# Patient Record
Sex: FEMALE | Race: WHITE | NOT HISPANIC OR LATINO | Employment: FULL TIME | URBAN - METROPOLITAN AREA
[De-identification: names, ages, dates, MRNs, and addresses within clinical notes are randomized per-mention and may not be internally consistent; named-entity substitution may affect disease eponyms.]

---

## 2023-12-05 ENCOUNTER — OFFICE VISIT (OUTPATIENT)
Dept: FAMILY MEDICINE CLINIC | Facility: CLINIC | Age: 48
End: 2023-12-05
Payer: COMMERCIAL

## 2023-12-05 ENCOUNTER — LAB (OUTPATIENT)
Dept: FAMILY MEDICINE CLINIC | Facility: CLINIC | Age: 48
End: 2023-12-05
Payer: COMMERCIAL

## 2023-12-05 VITALS
HEART RATE: 79 BPM | WEIGHT: 157.2 LBS | RESPIRATION RATE: 16 BRPM | DIASTOLIC BLOOD PRESSURE: 82 MMHG | OXYGEN SATURATION: 98 % | SYSTOLIC BLOOD PRESSURE: 116 MMHG | HEIGHT: 59 IN | BODY MASS INDEX: 31.69 KG/M2

## 2023-12-05 DIAGNOSIS — Z12.31 SCREENING MAMMOGRAM FOR BREAST CANCER: ICD-10-CM

## 2023-12-05 DIAGNOSIS — Z12.11 SCREENING FOR COLORECTAL CANCER: ICD-10-CM

## 2023-12-05 DIAGNOSIS — Z00.00 ENCOUNTER FOR ANNUAL PHYSICAL EXAM: ICD-10-CM

## 2023-12-05 DIAGNOSIS — Z12.12 SCREENING FOR COLORECTAL CANCER: ICD-10-CM

## 2023-12-05 DIAGNOSIS — Z00.00 ENCOUNTER FOR ANNUAL PHYSICAL EXAM: Primary | ICD-10-CM

## 2023-12-05 PROCEDURE — 83036 HEMOGLOBIN GLYCOSYLATED A1C: CPT | Performed by: STUDENT IN AN ORGANIZED HEALTH CARE EDUCATION/TRAINING PROGRAM

## 2023-12-05 PROCEDURE — 85025 COMPLETE CBC W/AUTO DIFF WBC: CPT | Performed by: STUDENT IN AN ORGANIZED HEALTH CARE EDUCATION/TRAINING PROGRAM

## 2023-12-05 PROCEDURE — 80061 LIPID PANEL: CPT | Performed by: STUDENT IN AN ORGANIZED HEALTH CARE EDUCATION/TRAINING PROGRAM

## 2023-12-05 PROCEDURE — 86803 HEPATITIS C AB TEST: CPT | Performed by: STUDENT IN AN ORGANIZED HEALTH CARE EDUCATION/TRAINING PROGRAM

## 2023-12-05 PROCEDURE — 80053 COMPREHEN METABOLIC PANEL: CPT | Performed by: STUDENT IN AN ORGANIZED HEALTH CARE EDUCATION/TRAINING PROGRAM

## 2023-12-05 PROCEDURE — 36415 COLL VENOUS BLD VENIPUNCTURE: CPT

## 2023-12-05 NOTE — PROGRESS NOTES
[facility]   New Patient Visit    Patient Name:Jessika Vegas  : 1975  MRN: [id number]  Primary Care Physician: Mora Clark MD    Subjective    SUBJECTIVE     Chief Complaint  Chief Complaint   Patient presents with    [facility] Care     No   Est care       History of Present Illness  Jessika Vegas is a 48 y.o. female who presents to clinic to establish [facility].       Chronic Conditions/Other Concerns   Bump on L head: started over the summer; thought it was pimple but staying and not resolving; not growing or changing in size      Healthcare Maintenance   Diet: healthy eating habits and balanced diet  Exercise: generally stays active (but no regular exercise)  Mood: Denies depression symptoms (PHQ-2 screen is negative)  Vision/Dental Health: UTD, no concerns   reports that she has never smoked. She has never used smokeless tobacco. She reports current alcohol use. She reports that she does not use drugs.  Mammogram 2 years ago; normal  Pap smear: last 5 years ago; all normal (22-23 years ago; hx of colposcopy but normal)    Reproductive Health   Menstrual Status: premenopausal  regular, normal duration, and normal flow  Currently sexually active, , and in mutually monogamous relationship   reports being sexually active and has had partner(s) who are male. She reports using the following method of birth control/protection: Vasectomy.  Contraception: vasectomy    Health Maintenance   Topic Date Due    COLORECTAL CANCER SCREENING  Never done    TDAP/TD VACCINES (1 - Tdap) Never done    INFLUENZA VACCINE  2023    ANNUAL PHYSICAL  Never done    PAP SMEAR  Never done    BMI FOLLOWUP  2024    HEPATITIS C SCREENING  Completed    Pneumococcal Vaccine 0-64  Aged Out    COVID-19 Vaccine  Discontinued       Review of Systems  A medically appropriate and patient-specific review of systems was performed. Pertinent findings are mentioned in the HPI,  "with no additional significant findings beyond those already noted.    Patient History   Personally reviewed patient's prior histories and updated the chart; pertinent information mentioned below:  Review (Popup)     History Review Reviewed Comments   Past Medical History:  [x]   has no past medical history on file.   Past Surgical History: [x]   has a past surgical history that includes Cholecystectomy (12/2015).   Family History: [x]  family history includes Alcohol abuse in her father; COPD in her father; Hyperlipidemia in her mother; Miscarriages / Stillbirths in her mother; Stroke in her father.   Social History: [x]   reports that she has never smoked. She has never used smokeless tobacco. She reports current alcohol use. She reports that she does not use drugs.   Medications [x]    Allergies [x] Patient has no known allergies.          Objective   OBJECTIVE     Vitals:    12/05/23 1529   BP: 116/82   BP Location: Left arm   Patient Position: Sitting   Cuff Size: Large Adult   Pulse: 79   Resp: 16   SpO2: 98%   Weight: 71.3 kg (157 lb 3.2 oz)   Height: 149.9 cm (59\")      BMI Readings from Last 3 Encounters:   12/05/23 31.75 kg/m²      BMI is >= 30 and <35. (Class 1 Obesity). The following options were offered after discussion;: exercise counseling/recommendations and nutrition counseling/recommendations    Physical Exam   Constitutional: Alert, well-appearing, no acute distress  Eyes: Vision grossly intact, sclerae anicteric, no conjunctival injection  HENT: NCAT, mucous membranes moist, normal dentition  Neck: Supple, no thyromegaly, no lymphadenopathy, trachea midline  Respiratory: No respiratory distress, good effort and air entry, clear to auscultation bilaterally   Cardiovascular: RRR, no murmurs, normal peripheral perfusion, no LE edema  Gastrointestinal: Soft, nondistended, nontender, bowel sounds present  Musculoskeletal: Strength grossly intact, appropriate ROM in all extremities, no obvious " deformities or injuries  Psychiatric: Appropriate mood and affect, cooperative  Neurologic: At baseline, motor and sensory function grossly intact, moves all extremities equally  Skin: No apparent rashes or lesions         Assessment    ASSESSMENT & PLAN       ANNUAL PHYSICAL EXAM; HEALTHCARE MAINTENANCE  Preventative  Immunizations  - Due for Tdap and influenza.   Cancer Screenings  Colon: currently due, appropriate for cologuard, ordered today  Breast: Currently due, order placed today  Cervical: Currently due & offered today, will reschedule due to patient preference/request  CV Risk  A1c, Lipids: ordered today  Infectious Disease  Hep C: Needs one-time screening, ordered today  STDs: Screening not currently indicated.   PLAN:  Influenza administered today. Will get Tdap at next visit or at local pharmacy.  Orders placed today: cologuard, mammogram, Hep C Ab, Hgb A1c, Lipid Panel, routine labs including CBCd, CMP  Obesity: Body mass index is 31.75 kg/m². Reviewed associated health risks. Recommended therapeutic lifestyle interventions; counseling and education provided.  Routine preventative/screening labs ordered today (as above) and are currently pending; further management pending results  Counseled patient on the following topics: healthy diet and nutrition, physical activity, healthy weight, immunizations including risks and benefits, disease prevention and preventative screenings, injury prevention (seat belt use, helmet use on motorcycles/bicycles, home safety measures), high risk behaviors and specific risk reduction strategies, dental health, routine vision screening, mental health and appropriate coping mechanisms and management of stress/anxiety.      Orders Placed This Encounter   Procedures    Mammo Screening Digital Tomosynthesis Bilateral With CAD    Fluzone >6 Months ([id number])    CBC Auto Differential    Comprehensive Metabolic Panel    Hemoglobin A1c    Lipid Panel    Hepatitis C Antibody     Cologuard - Stool, Per Rectum          Follow Up   Return in about 1 year (around 12/5/2024) for Annual physical.      This medical documentation was produced in part using speech recognition software (Dragon Dictation System) and may contain errors related to that system including errors in grammar, punctuation, and spelling, as well as words and phrases that may be inappropriate and not intentional --- If there are any questions or concerns please feel free to contact me, the dictating provider, for clarification.      Mora Clark MD

## 2023-12-06 LAB
ALBUMIN SERPL-MCNC: 4.7 G/DL (ref 3.5–5.2)
ALBUMIN/GLOB SERPL: 1.6 G/DL
ALP SERPL-CCNC: 55 U/L (ref 39–117)
ALT SERPL W P-5'-P-CCNC: 13 U/L (ref 1–33)
ANION GAP SERPL CALCULATED.3IONS-SCNC: 13 MMOL/L (ref 5–15)
AST SERPL-CCNC: 15 U/L (ref 1–32)
BASOPHILS # BLD AUTO: 0.05 10*3/MM3 (ref 0–0.2)
BASOPHILS NFR BLD AUTO: 0.4 % (ref 0–1.5)
BILIRUB SERPL-MCNC: 0.3 MG/DL (ref 0–1.2)
BUN SERPL-MCNC: 16 MG/DL (ref 6–20)
BUN/CREAT SERPL: 24.6 (ref 7–25)
CALCIUM SPEC-SCNC: 9.5 MG/DL (ref 8.6–10.5)
CHLORIDE SERPL-SCNC: 103 MMOL/L (ref 98–107)
CHOLEST SERPL-MCNC: 190 MG/DL (ref 0–200)
CO2 SERPL-SCNC: 20 MMOL/L (ref 22–29)
CREAT SERPL-MCNC: 0.65 MG/DL (ref 0.57–1)
DEPRECATED RDW RBC AUTO: 44.2 FL (ref 37–54)
EGFRCR SERPLBLD CKD-EPI 2021: 108.8 ML/MIN/1.73
EOSINOPHIL # BLD AUTO: 0.4 10*3/MM3 (ref 0–0.4)
EOSINOPHIL NFR BLD AUTO: 3.4 % (ref 0.3–6.2)
ERYTHROCYTE [DISTWIDTH] IN BLOOD BY AUTOMATED COUNT: 13.3 % (ref 12.3–15.4)
GLOBULIN UR ELPH-MCNC: 3 GM/DL
GLUCOSE SERPL-MCNC: 84 MG/DL (ref 65–99)
HBA1C MFR BLD: 5.5 % (ref 4.8–5.6)
HCT VFR BLD AUTO: 42.8 % (ref 34–46.6)
HCV AB SER DONR QL: NORMAL
HDLC SERPL-MCNC: 62 MG/DL (ref 40–60)
HGB BLD-MCNC: 14.3 G/DL (ref 12–15.9)
IMM GRANULOCYTES # BLD AUTO: 0.05 10*3/MM3 (ref 0–0.05)
IMM GRANULOCYTES NFR BLD AUTO: 0.4 % (ref 0–0.5)
LDLC SERPL CALC-MCNC: 108 MG/DL (ref 0–100)
LDLC/HDLC SERPL: 1.71 {RATIO}
LYMPHOCYTES # BLD AUTO: 3.62 10*3/MM3 (ref 0.7–3.1)
LYMPHOCYTES NFR BLD AUTO: 31 % (ref 19.6–45.3)
MCH RBC QN AUTO: 30.6 PG (ref 26.6–33)
MCHC RBC AUTO-ENTMCNC: 33.4 G/DL (ref 31.5–35.7)
MCV RBC AUTO: 91.5 FL (ref 79–97)
MONOCYTES # BLD AUTO: 0.65 10*3/MM3 (ref 0.1–0.9)
MONOCYTES NFR BLD AUTO: 5.6 % (ref 5–12)
NEUTROPHILS NFR BLD AUTO: 59.2 % (ref 42.7–76)
NEUTROPHILS NFR BLD AUTO: 6.9 10*3/MM3 (ref 1.7–7)
NRBC BLD AUTO-RTO: 0 /100 WBC (ref 0–0.2)
PLATELET # BLD AUTO: 290 10*3/MM3 (ref 140–450)
PMV BLD AUTO: 11.5 FL (ref 6–12)
POTASSIUM SERPL-SCNC: 3.9 MMOL/L (ref 3.5–5.2)
PROT SERPL-MCNC: 7.7 G/DL (ref 6–8.5)
RBC # BLD AUTO: 4.68 10*6/MM3 (ref 3.77–5.28)
SODIUM SERPL-SCNC: 136 MMOL/L (ref 136–145)
TRIGL SERPL-MCNC: 110 MG/DL (ref 0–150)
VLDLC SERPL-MCNC: 20 MG/DL (ref 5–40)
WBC NRBC COR # BLD AUTO: 11.67 10*3/MM3 (ref 3.4–10.8)

## 2024-01-16 ENCOUNTER — TELEPHONE (OUTPATIENT)
Dept: FAMILY MEDICINE CLINIC | Facility: CLINIC | Age: 49
End: 2024-01-16
Payer: COMMERCIAL

## 2024-12-06 ENCOUNTER — OFFICE VISIT (OUTPATIENT)
Dept: FAMILY MEDICINE CLINIC | Facility: CLINIC | Age: 49
End: 2024-12-06
Payer: COMMERCIAL

## 2024-12-06 ENCOUNTER — LAB (OUTPATIENT)
Dept: FAMILY MEDICINE CLINIC | Facility: CLINIC | Age: 49
End: 2024-12-06
Payer: COMMERCIAL

## 2024-12-06 VITALS
RESPIRATION RATE: 18 BRPM | OXYGEN SATURATION: 98 % | WEIGHT: 174.3 LBS | SYSTOLIC BLOOD PRESSURE: 126 MMHG | HEART RATE: 93 BPM | HEIGHT: 59 IN | DIASTOLIC BLOOD PRESSURE: 72 MMHG | BODY MASS INDEX: 35.14 KG/M2

## 2024-12-06 DIAGNOSIS — Z00.00 ENCOUNTER FOR ANNUAL PHYSICAL EXAM: Primary | ICD-10-CM

## 2024-12-06 DIAGNOSIS — Z00.00 ENCOUNTER FOR ANNUAL PHYSICAL EXAM: ICD-10-CM

## 2024-12-06 DIAGNOSIS — Z01.419 ENCOUNTER FOR GYNECOLOGICAL EXAMINATION: ICD-10-CM

## 2024-12-06 DIAGNOSIS — Z23 NEED FOR TETANUS, DIPHTHERIA, AND ACELLULAR PERTUSSIS (TDAP) VACCINE: ICD-10-CM

## 2024-12-06 PROBLEM — K21.9 GASTROESOPHAGEAL REFLUX DISEASE: Status: RESOLVED | Noted: 2024-12-06 | Resolved: 2024-12-06

## 2024-12-06 LAB
ALBUMIN SERPL-MCNC: 4.2 G/DL (ref 3.5–5.2)
ALBUMIN/GLOB SERPL: 1.3 G/DL
ALP SERPL-CCNC: 51 U/L (ref 39–117)
ALT SERPL W P-5'-P-CCNC: 25 U/L (ref 1–33)
ANION GAP SERPL CALCULATED.3IONS-SCNC: 11 MMOL/L (ref 5–15)
AST SERPL-CCNC: 20 U/L (ref 1–32)
BASOPHILS # BLD AUTO: 0.05 10*3/MM3 (ref 0–0.2)
BASOPHILS NFR BLD AUTO: 0.5 % (ref 0–1.5)
BILIRUB SERPL-MCNC: 0.2 MG/DL (ref 0–1.2)
BUN SERPL-MCNC: 15 MG/DL (ref 6–20)
BUN/CREAT SERPL: 23.4 (ref 7–25)
CALCIUM SPEC-SCNC: 9.3 MG/DL (ref 8.6–10.5)
CHLORIDE SERPL-SCNC: 103 MMOL/L (ref 98–107)
CHOLEST SERPL-MCNC: 192 MG/DL (ref 0–200)
CO2 SERPL-SCNC: 24 MMOL/L (ref 22–29)
CREAT SERPL-MCNC: 0.64 MG/DL (ref 0.57–1)
DEPRECATED RDW RBC AUTO: 43.9 FL (ref 37–54)
EGFRCR SERPLBLD CKD-EPI 2021: 108.5 ML/MIN/1.73
EOSINOPHIL # BLD AUTO: 0.2 10*3/MM3 (ref 0–0.4)
EOSINOPHIL NFR BLD AUTO: 2.1 % (ref 0.3–6.2)
ERYTHROCYTE [DISTWIDTH] IN BLOOD BY AUTOMATED COUNT: 13.2 % (ref 12.3–15.4)
GLOBULIN UR ELPH-MCNC: 3.3 GM/DL
GLUCOSE SERPL-MCNC: 83 MG/DL (ref 65–99)
HBA1C MFR BLD: 5.8 % (ref 4.8–5.6)
HCT VFR BLD AUTO: 41.1 % (ref 34–46.6)
HDLC SERPL-MCNC: 63 MG/DL (ref 40–60)
HGB BLD-MCNC: 13.6 G/DL (ref 12–15.9)
IMM GRANULOCYTES # BLD AUTO: 0.08 10*3/MM3 (ref 0–0.05)
IMM GRANULOCYTES NFR BLD AUTO: 0.8 % (ref 0–0.5)
LDLC SERPL CALC-MCNC: 96 MG/DL (ref 0–100)
LDLC/HDLC SERPL: 1.43 {RATIO}
LYMPHOCYTES # BLD AUTO: 3.48 10*3/MM3 (ref 0.7–3.1)
LYMPHOCYTES NFR BLD AUTO: 36.2 % (ref 19.6–45.3)
MCH RBC QN AUTO: 30.6 PG (ref 26.6–33)
MCHC RBC AUTO-ENTMCNC: 33.1 G/DL (ref 31.5–35.7)
MCV RBC AUTO: 92.4 FL (ref 79–97)
MONOCYTES # BLD AUTO: 0.62 10*3/MM3 (ref 0.1–0.9)
MONOCYTES NFR BLD AUTO: 6.5 % (ref 5–12)
NEUTROPHILS NFR BLD AUTO: 5.18 10*3/MM3 (ref 1.7–7)
NEUTROPHILS NFR BLD AUTO: 53.9 % (ref 42.7–76)
NRBC BLD AUTO-RTO: 0 /100 WBC (ref 0–0.2)
PLATELET # BLD AUTO: 338 10*3/MM3 (ref 140–450)
PMV BLD AUTO: 10.8 FL (ref 6–12)
POTASSIUM SERPL-SCNC: 4.1 MMOL/L (ref 3.5–5.2)
PROT SERPL-MCNC: 7.5 G/DL (ref 6–8.5)
RBC # BLD AUTO: 4.45 10*6/MM3 (ref 3.77–5.28)
SODIUM SERPL-SCNC: 138 MMOL/L (ref 136–145)
TRIGL SERPL-MCNC: 194 MG/DL (ref 0–150)
VLDLC SERPL-MCNC: 33 MG/DL (ref 5–40)
WBC NRBC COR # BLD AUTO: 9.61 10*3/MM3 (ref 3.4–10.8)

## 2024-12-06 PROCEDURE — 90472 IMMUNIZATION ADMIN EACH ADD: CPT | Performed by: STUDENT IN AN ORGANIZED HEALTH CARE EDUCATION/TRAINING PROGRAM

## 2024-12-06 PROCEDURE — 90656 IIV3 VACC NO PRSV 0.5 ML IM: CPT | Performed by: STUDENT IN AN ORGANIZED HEALTH CARE EDUCATION/TRAINING PROGRAM

## 2024-12-06 PROCEDURE — 83036 HEMOGLOBIN GLYCOSYLATED A1C: CPT | Performed by: STUDENT IN AN ORGANIZED HEALTH CARE EDUCATION/TRAINING PROGRAM

## 2024-12-06 PROCEDURE — 90715 TDAP VACCINE 7 YRS/> IM: CPT | Performed by: STUDENT IN AN ORGANIZED HEALTH CARE EDUCATION/TRAINING PROGRAM

## 2024-12-06 PROCEDURE — 80053 COMPREHEN METABOLIC PANEL: CPT | Performed by: STUDENT IN AN ORGANIZED HEALTH CARE EDUCATION/TRAINING PROGRAM

## 2024-12-06 PROCEDURE — 80061 LIPID PANEL: CPT | Performed by: STUDENT IN AN ORGANIZED HEALTH CARE EDUCATION/TRAINING PROGRAM

## 2024-12-06 PROCEDURE — 36415 COLL VENOUS BLD VENIPUNCTURE: CPT

## 2024-12-06 PROCEDURE — 85025 COMPLETE CBC W/AUTO DIFF WBC: CPT | Performed by: STUDENT IN AN ORGANIZED HEALTH CARE EDUCATION/TRAINING PROGRAM

## 2024-12-06 PROCEDURE — 99396 PREV VISIT EST AGE 40-64: CPT | Performed by: STUDENT IN AN ORGANIZED HEALTH CARE EDUCATION/TRAINING PROGRAM

## 2024-12-06 PROCEDURE — 90471 IMMUNIZATION ADMIN: CPT | Performed by: STUDENT IN AN ORGANIZED HEALTH CARE EDUCATION/TRAINING PROGRAM

## 2024-12-06 NOTE — PROGRESS NOTES
[facility]  Established Patient Visit  12/06/2024     Patient Name: Jessika Vegas   YOB: 1975   Medical Record Number: [id number]   Primary Care Physician: Mora Clark MD     Subjective   Chief Complaint     Chief Complaint   Patient presents with    Annual Exam       History of Present Illness       She reports no new health concerns since her last visit.  She maintains an active lifestyle, working from home and engaging in gardening.    She underwent a mammogram last Wednesday and is awaiting the results.         Chronic Conditions         Preventative/HCM     DIET: healthy eating habits and balanced diet  EXERCISE: generally stays active (but no regular exercise)  MOOD: Denies depression symptoms (PHQ-2 screen is negative)  VISION/DENTAL: UTD, no concerns    SOCIAL HISTORY:  reports that she has never smoked. She has never used smokeless tobacco. She reports current alcohol use. She reports that she does not use drugs.  SEXUAL HISTORY:  reports being sexually active and has had partner(s) who are male. She reports using the following method of birth control/protection: Vasectomy.    MENSTRUAL STATUS: premenopausal  Menstrual Cycles: Her last menstrual period was on 11/25/2024, and her menstrual cycles remain regular.    IMMUNIZATIONS: She has not received a tetanus vaccine in the past decade.    CANCER SCREENING:   Colon Cancer: madisyn 1/4/24  Breast Cancer: had mammogram last week at [facility] - normal  Cervical Cancer: She has not had a Pap smear in several years and wishes to have one today, even though she is not experiencing any related issues. She has a history of an abnormal Pap smear in her 30s, which was followed by a biopsy that returned normal results. She has not had any abnormal Pap smears since then.    Health Maintenance   Topic Date Due    TDAP/TD VACCINES (1 - Tdap) Never done    MAMMOGRAM  Never done    PAP SMEAR   "Never done    INFLUENZA VACCINE  07/01/2024    ANNUAL PHYSICAL  12/05/2024    BMI FOLLOWUP  12/05/2024    COLORECTAL CANCER SCREENING  01/04/2027    HEPATITIS C SCREENING  Completed    Pneumococcal Vaccine 0-64  Aged Out    COVID-19 Vaccine  Discontinued           Review of Systems   A medically appropriate and patient-specific review of systems was performed. Pertinent findings are mentioned in the HPI, with no additional significant findings beyond those already noted.    Patient History   The following portions of the patient's history were reviewed and updated as appropriate:   allergies, current medications, problem list, PMHx, PSHx, PFHx, past social history      Objective   Vitals     Vitals:    12/06/24 0806   BP: 126/72   Pulse: 93   Resp: 18   SpO2: 98%   Weight: 79.1 kg (174 lb 4.8 oz)   Height: 149.9 cm (59.02\")      BMI Readings from Last 3 Encounters:   12/06/24 35.19 kg/m²   12/05/23 31.75 kg/m²     Class 2 Severe Obesity (BMI >=35 and <=39.9). Obesity-related health conditions include the following: none. Obesity is worsening. BMI is is above average; BMI management plan is completed. We discussed portion control and increasing exercise.        Physical Exam   Constitutional: Alert, well-appearing, no acute distress  HENT: NCAT, mucous membranes moist, bilateral TM normal, oropharynx normal  Neck: Supple, no thyromegaly, no lymphadenopathy  Respiratory: No respiratory distress, good effort and air entry, clear to auscultation bilaterally   Cardiovascular: RRR, no LE edema  Neuro: No gross focal deficits, normal gait  Psychiatric: Appropriate mood and affect, cooperative  Skin: No apparent rashes or lesions            Assessment & Plan        Encounter for annual physical exam  Cancer and preventative screenings were reviewed and ordered as indicated (see below). Immunizations were reviewed and education/instructions were provided on any recommended vaccines that are currenlty due. Routine labs " ordered today for monitoring and screening as below. Counseled patient on diet, exercise, weight, vaccines, disease/screening prevention, safety, risk reduction, dental/vision care, and mental health. Addressed all patient/family questions.   Orders:    CBC Auto Differential; Future    Comprehensive Metabolic Panel; Future    Hemoglobin A1c; Future    Lipid Panel; Future    Encounter for gynecological examination  Orders:    IGP, Aptima HPV, Rfx 16 / 18,45; Future    Need for tetanus, diphtheria, and acellular pertussis (Tdap) vaccine  Orders:    Tdap Vaccine => 6yo IM (BOOSTRIX/ADACEL)          In addition to the above, I also completed the following during today's visit: educated the patient and/or family on the overall impression and recommended plan of care, encouraged the patient/family to voice questions, and addressed all inquiries. I reviewed the appropriate use of any current medications and emphasized important side effects to be aware of, provided education on any new medications that were started, including their indications, proper administration, dosing, and potential side effects as applicable. We reviewed return precautions and reasons to seek urgent/emergent care, and if indicated, I reiterated the importance of maintaining a healthy diet and regular physical activity on chronic conditions and overall well-being.     Follow Up   Return in about 1 year (around 12/6/2025) for Annual physical.      This medical documentation was produced in part using speech recognition software (Dragon Dictation System) and may contain errors related to that system including errors in grammar, punctuation, and spelling, as well as words and phrases that may be inappropriate and not intentional --- If there are any questions or concerns please feel free to contact me, the dictating provider, for clarification.        Disclaimer For Patients: Per the Federal 21st Century CURES Act and as of April 2021, medical records  (including provider notes and laboratory/imaging results) are to be made available to patient’s and/or their designees as soon as the documents are signed/resulted. While the intention is to ensure transparency and to engage patients in their healthcare, this immediate access may create unintended consequences. This document uses language intended for communication between medical experts and diagnostic results are often interpreted with the entirety of the patient’s clinical picture in mind. It is recommended that patients and/or their designees review all available information with their primary or specialist providers for explanation and guidance to avoid misinterpretation based on layperson understanding, non-medical expert opinions, or internet searches.      Mora Clark MD

## 2024-12-12 LAB
CYTOLOGIST CVX/VAG CYTO: NORMAL
CYTOLOGY CVX/VAG DOC CYTO: NORMAL
CYTOLOGY CVX/VAG DOC THIN PREP: NORMAL
DX ICD CODE: NORMAL
HPV GENOTYPE REFLEX: NORMAL
HPV I/H RISK 4 DNA CVX QL PROBE+SIG AMP: NEGATIVE
Lab: NORMAL
OTHER STN SPEC: NORMAL
STAT OF ADQ CVX/VAG CYTO-IMP: NORMAL